# Patient Record
Sex: MALE | Race: WHITE | NOT HISPANIC OR LATINO
[De-identification: names, ages, dates, MRNs, and addresses within clinical notes are randomized per-mention and may not be internally consistent; named-entity substitution may affect disease eponyms.]

---
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
## 2021-04-05 ENCOUNTER — TRANSCRIPTION ENCOUNTER (OUTPATIENT)
Age: 21
End: 2021-04-05

## 2021-12-05 ENCOUNTER — EMERGENCY (EMERGENCY)
Facility: HOSPITAL | Age: 21
LOS: 0 days | Discharge: HOME | End: 2021-12-05
Attending: HOSPITALIST
Payer: COMMERCIAL

## 2021-12-05 VITALS
DIASTOLIC BLOOD PRESSURE: 84 MMHG | RESPIRATION RATE: 18 BRPM | OXYGEN SATURATION: 98 % | SYSTOLIC BLOOD PRESSURE: 161 MMHG | TEMPERATURE: 99 F | HEART RATE: 76 BPM

## 2021-12-05 VITALS — HEIGHT: 69 IN | WEIGHT: 190.04 LBS
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
## 2021-12-05 DIAGNOSIS — M25.532 PAIN IN LEFT WRIST: ICD-10-CM
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
## 2021-12-05 DIAGNOSIS — S52.502A UNSPECIFIED FRACTURE OF THE LOWER END OF LEFT RADIUS, INITIAL ENCOUNTER FOR CLOSED FRACTURE: ICD-10-CM

## 2021-12-05 PROCEDURE — 73110 X-RAY EXAM OF WRIST: CPT | Mod: LT

## 2021-12-05 PROCEDURE — 73120 X-RAY EXAM OF HAND: CPT | Mod: LT

## 2021-12-05 PROCEDURE — 99284 EMERGENCY DEPT VISIT MOD MDM: CPT | Mod: 25

## 2021-12-05 PROCEDURE — 73090 X-RAY EXAM OF FOREARM: CPT | Mod: 26,LT

## 2021-12-05 PROCEDURE — 73090 X-RAY EXAM OF FOREARM: CPT | Mod: LT

## 2021-12-05 PROCEDURE — 73110 X-RAY EXAM OF WRIST: CPT | Mod: 26,LT

## 2021-12-05 PROCEDURE — 99284 EMERGENCY DEPT VISIT MOD MDM: CPT

## 2021-12-05 PROCEDURE — 73090 X-RAY EXAM OF FOREARM: CPT | Mod: 26,LT,77

## 2021-12-05 PROCEDURE — 73120 X-RAY EXAM OF HAND: CPT | Mod: 26,LT

## 2021-12-05 RX ORDER — OXYCODONE AND ACETAMINOPHEN 5; 325 MG/1; MG/1
1 TABLET ORAL ONCE
Refills: 0 | Status: DISCONTINUED | OUTPATIENT
Start: 2021-12-05 | End: 2021-12-05

## 2021-12-05 RX ORDER — IBUPROFEN 200 MG
600 TABLET ORAL ONCE
Refills: 0 | Status: DISCONTINUED | OUTPATIENT
Start: 2021-12-05 | End: 2021-12-05

## 2021-12-05 RX ADMIN — OXYCODONE AND ACETAMINOPHEN 1 TABLET(S): 5; 325 TABLET ORAL at 22:13

## 2021-12-05 NOTE — ED STATDOCS - PATIENT PORTAL LINK FT
You can access the FollowMyHealth Patient Portal offered by [facility] by registering at the following website: [email or web address]. By joining [employer]’s FollowMyHealth portal, you will also be able to view your health information using other applications (apps) compatible with our system.

## 2021-12-05 NOTE — ED STATDOCS - CLINICAL SUMMARY MEDICAL DECISION MAKING FREE TEXT BOX
22 y/o M with L wrist pain and deformity after fall. Pt is R handed. will obtain X ray, pain control and splint.

## 2021-12-05 NOTE — ED STATDOCS - PROGRESS NOTE DETAILS
Pt with deformity to left distal radius, posteriorly.  Sens intact, cap refill < 2 sec, 2+ radial pulses, Stregnth 5/5.  Xray with displaced distal radius fx. Dr. Ag reviewed films.  Resident saw pt in the ED.  Reduced distal Radiaus fx and applied splint.  Reviewed post-reduction films and dc home.  Will see in office tomorrow.  Yvonne Cobos PA-C

## 2021-12-05 NOTE — ED STATDOCS - NS_ ATTENDINGSCRIBEDETAILS _ED_A_ED_FT
Sheila Mcdaniel MD: The history, relevant review of systems, past medical and surgical history, medical decision making, and physical examination was documented by the scribe in my presence and I attest to the accuracy of the documentation.

## 2021-12-05 NOTE — ED STATDOCS - OBJECTIVE STATEMENT
20 y/o M with a PMHx of gynecomastia presents to the ED c/o L wrist pain in the setting of falling  while snowboarding today in [place]. States that the injury occurred 5 hours ago; took Tylenol 2 PM today. States that post injury he came back home  and now presents to the ED for further evaluation and requesting an X ray.

## 2021-12-05 NOTE — ED STATDOCS - CARE PROVIDER_API CALL
Nadiya Ag)  Orthopaedic Surgery; Surgery of the Hand  [address] [place] [address]  Phone: [phone]  Fax: [phone]  Follow Up Time:

## 2021-12-05 NOTE — ED ADULT TRIAGE NOTE - CHIEF COMPLAINT QUOTE
Pt comes to the ED complaining of left wrist injury while snowboarding in [place] earlier today, Splint applied and patient drove to [place] to be evaluated and treated.

## 2021-12-05 NOTE — CONSULT NOTE ADULT - SUBJECTIVE AND OBJECTIVE BOX
21y R Hand Dominant. Male patient presents to [place] ED c/o severe L wrist pain s/p mechanical fall while snowboarding earlier today. Patient denies head hit or LOC. Localizing pain to distal radius. Denies radiation of pain. Pt denies numbness, tingling or burning. Patient denies any other injuries.    PMH:  Gynecomastia      PSH:  No significant past surgical history    Meds: See med rec    T(C): 37.1 (12-05-21 @ 21:34)  HR: 76 (12-05-21 @ 21:34)  BP: 161/84 (12-05-21 @ 21:34)  RR: 18 (12-05-21 @ 21:34)  SpO2: 98% (12-05-21 @ 21:34)  Wt(kg): --    PE :  Gen: NAD, A/Ox3, Following commands    L UE:  Skin intact,  visible deformity of wrist, + soft tissue swelling, no ecchymosis  Decreased ROM of Wrist 2/2 pain  Normal Elbow/Shoulder ROM w/o pain  + TTP over distal radius  No Snuff box TTP  + Rad Pulse   SILT C5-T1  +AIN/PIN/Ulnar/Radial/Musc/Median  compartments soft and compressible    Secondary Survey:   No TTP over bony prominences, SILT, palpable pulses, full/painless A/PROM, compartments soft. No TTP over spinous processes or paraspinal muscles at C/T/L spine. No palpable step off. No other injuries or complaints. Able to SLR BL. Negative logroll/heelstrike BL. Ambulating w/o assistance/pain.    Imaging:  XRay L Wrist  3 views of L Wrist demonstrates L distal radius fracture, intra-articular w/ posterior displacement of carpus/hand in relation to forearm. No other fx/dislocations noted.     Procedure Note:  After verbal consent obtained, ~ 6cc of 1% Lidocaine injected into area around DR/Ulna as hematoma block. UE hung by fingers and reduction maneuver performed. A well padded sugartong splint was applied to Forearm/Wrist and mold held. SD XR obtained which show improved alignment of Fracture. Post reduction NV exam unchanged. Pt tolerated procedure well.    A/P:   21yMale s/p Mech Fall w/ L Distal Radius Fracture s/p closed reduction and splinting.    -Pt advised to remove all jewelry from affected limb.  -Pain control  -Strict Ice/Elevation to help with swelling  -NWB L UE with splint and sling  -Keep splint clean/dry/intact;  -Encourage active finger motion to help with swelling  -Pt aware of possible need for surgical intervention of distal radius fracture. Will FU as outpatient    -Pt made aware of signs and symptoms of compartment syndrome and acute carpal tunnel syndrome. Aware of need to return to ED if symptoms develop.  -Recommend follow up outpatient w/ Dr. Ag tomorrow. The office will call to schedule appointment.  -All Patient's and Family Member's questions answered. Patient/family understand and agree w/ plan.  -Imaging and clinical presentation discussed w/ Dr. Ag who is aware and agrees w/ above plan.       21y R Hand Dominant. Male patient presents to [place] ED c/o severe L wrist pain s/p mechanical fall while snowboarding earlier today. Patient denies head hit or LOC. Localizing pain to distal radius. Denies radiation of pain. Pt denies numbness, tingling or burning. Patient denies any other injuries.    PMH:  Gynecomastia      PSH:  No significant past surgical history    Meds: See med rec    T(C): 37.1 (12-05-21 @ 21:34)  HR: 76 (12-05-21 @ 21:34)  BP: 161/84 (12-05-21 @ 21:34)  RR: 18 (12-05-21 @ 21:34)  SpO2: 98% (12-05-21 @ 21:34)  Wt(kg): --    PE :  Gen: NAD, A/Ox3, Following commands    L UE:  Skin intact,  visible deformity of wrist, + soft tissue swelling, no ecchymosis  Decreased ROM of Wrist 2/2 pain  Normal Elbow/Shoulder ROM w/o pain  + TTP over distal radius  No Snuff box TTP  + Rad Pulse   SILT C5-T1  +AIN/PIN/Ulnar/Radial/Musc/Median  compartments soft and compressible    Secondary Survey:   No TTP over bony prominences, SILT, palpable pulses, full/painless A/PROM, compartments soft. No TTP over spinous processes or paraspinal muscles at C/T/L spine. No palpable step off. No other injuries or complaints. Able to SLR BL. Negative logroll/heelstrike BL. Ambulating w/o assistance/pain.    Imaging:  XRay L Wrist  3 views of L Wrist demonstrates L distal radius fracture, intra-articular w/ posterior displacement of carpus/hand in relation to forearm. No other fx/dislocations noted.     Procedure Note:  After verbal consent obtained, ~ 6cc of 1% Lidocaine injected into area around DR/Ulna as hematoma block. UE hung by fingers and reduction maneuver performed. A well padded sugartong splint was applied to Forearm/Wrist and mold held. SD XR obtained which show improved alignment of Fracture. Post reduction NV exam unchanged. Pt tolerated procedure well.    A/P:   21yMale s/p Mech Fall w/ L Distal Radius Fracture s/p closed reduction and splinting.    -Pt advised to remove all jewelry from affected limb.  -Pain control  -Strict Ice/Elevation to help with swelling  -NWB L UE with splint and sling  -Keep splint clean/dry/intact;  -Encourage active finger motion to help with swelling  -Pt aware of possible need for surgical intervention of distal radius fracture. Will FU as outpatient    -Pt made aware of signs and symptoms of compartment syndrome and acute carpal tunnel syndrome. Aware of need to return to ED if symptoms develop.  -All Patient's and Family Member's questions answered. Patient/family understand and agree w/ plan.

## 2021-12-05 NOTE — CONSULT NOTE ADULT - ATTENDING COMMENTS
Patient seen and evaluated. Reduction performed after hematoma block. Adequate reduction obtained. Risks of possible displacement and need for surgical ORIF has been reviewed. Will followup in office in 1 week for evaluation and repeat xrays.

## 2021-12-06 ENCOUNTER — EMERGENCY (EMERGENCY)
Facility: HOSPITAL | Age: 21
LOS: 0 days | Discharge: HOME | End: 2021-12-06
Attending: EMERGENCY MEDICINE
Payer: COMMERCIAL

## 2021-12-06 VITALS
TEMPERATURE: 98 F | HEART RATE: 80 BPM | SYSTOLIC BLOOD PRESSURE: 138 MMHG | RESPIRATION RATE: 18 BRPM | OXYGEN SATURATION: 100 % | DIASTOLIC BLOOD PRESSURE: 62 MMHG

## 2021-12-06 VITALS — WEIGHT: 190.04 LBS | HEIGHT: 69 IN

## 2021-12-06 DIAGNOSIS — S52.502A UNSPECIFIED FRACTURE OF THE LOWER END OF LEFT RADIUS, INITIAL ENCOUNTER FOR CLOSED FRACTURE: ICD-10-CM
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
## 2021-12-06 PROCEDURE — 99284 EMERGENCY DEPT VISIT MOD MDM: CPT

## 2021-12-06 PROCEDURE — 99282 EMERGENCY DEPT VISIT SF MDM: CPT

## 2021-12-06 NOTE — ED STATDOCS - OBJECTIVE STATEMENT
22 y/o male with PMHx of Gynecomastia presents to the ED, sent in by Dr. Cotto. pt had a snow-boarding accident with a  left distal radius fracture. Pt was seen yesterday, returns today with pain. No other injuries or complaints.

## 2021-12-06 NOTE — ED STATDOCS - PATIENT PORTAL LINK FT
You can access the FollowMyHealth Patient Portal offered by [facility] by registering at the following website: [email or web address]. By joining [employer]’s FollowMyHealth portal, you will also be able to view your health information using other applications (apps) compatible with our system.

## 2021-12-06 NOTE — ED STATDOCS - CARE PROVIDER_API CALL
Nadiya Ag)  Orthopaedic Surgery; Surgery of the Hand  [address] [place] [address]  Phone: [phone]  Fax: [phone]  Follow Up Time:

## 2021-12-06 NOTE — ED ADULT TRIAGE NOTE - BMI (KG/M2)
RN NOTES

 PATIENT STABLE LYING IN THE BED. PER PATIENT REFUSED DINNER BECAUSE OF PAIN.  ENDORSED 
ONCOMING NURSE FOR PLAN OF CARE. 28.1

## 2021-12-06 NOTE — ED STATDOCS - PROGRESS NOTE DETAILS
20 y/o Male returns to the ED sp fall while snowboarding last night with injury to left wrist.  R hand dominant male.  Dr. Ag present in ED to eval pt.  Pt with c/o continued pain.  Was encouraged to elevate left hand, use sling.  Add Ibuprofen 600 mg every hours with Percocet for breakthrough pain.  Will f.u in office next Tuesday.   Yvonne Cobos PA-C

## 2021-12-16 ENCOUNTER — APPOINTMENT (OUTPATIENT)
Dept: ORTHOPEDIC SURGERY | Facility: CLINIC | Age: 21
End: 2021-12-16

## 2022-01-05 ENCOUNTER — APPOINTMENT (OUTPATIENT)
Dept: DERMATOLOGY | Facility: CLINIC | Age: 22
End: 2022-01-05
Payer: COMMERCIAL

## 2022-01-05 PROCEDURE — 99214 OFFICE O/P EST MOD 30 MIN: CPT

## 2022-01-05 RX ORDER — GLYCOPYRRONIUM 2.4 G/100G
2.4 CLOTH TOPICAL
Qty: 1 | Refills: 5 | Status: DISCONTINUED | COMMUNITY
Start: 2019-06-05 | End: 2022-01-05

## 2022-01-05 RX ORDER — TAMOXIFEN CITRATE 20 MG/1
TABLET, FILM COATED ORAL
Refills: 0 | Status: DISCONTINUED | COMMUNITY
End: 2022-01-05

## 2022-01-05 NOTE — ASSESSMENT
[FreeTextEntry1] : Benign nevi of back;  mild atypia;  recommend annual f/u\par \par ? male pattern alopecia;  likely physiologic;  \par \par Therapeutic options and their risks and benefits; along with multiple diagnostic possibilities were discussed at length; risks and benefits of further study were discussed;\par \par Photos taken;  no evidence of active AGA;  observe for now;  photos taken;  recheck in 6 months;  discussed at length

## 2022-01-05 NOTE — PHYSICAL EXAM
[FreeTextEntry3] : + lentigines and solar damage are present in sun exposed areas; \par \par Multiple benign nevi were noted. back;  largest;  1.0 cm red/brown junctional nevus mid upper back\par \par Scalp;  early temporal thinning;  b/l  \par no thinning at top or crown; \par

## 2022-01-05 NOTE — HISTORY OF PRESENT ILLNESS
[de-identified] : Pt. for check of hair;  concerned with hair loss; \par also:  check of moles on back\par

## 2022-05-04 ENCOUNTER — NON-APPOINTMENT (OUTPATIENT)
Age: 22
End: 2022-05-04

## 2022-05-04 DIAGNOSIS — E29.1 TESTICULAR HYPOFUNCTION: ICD-10-CM

## 2022-05-06 ENCOUNTER — APPOINTMENT (OUTPATIENT)
Dept: INTERNAL MEDICINE | Facility: CLINIC | Age: 22
End: 2022-05-06
Payer: COMMERCIAL

## 2022-05-06 VITALS
HEART RATE: 60 BPM | BODY MASS INDEX: 28.63 KG/M2 | WEIGHT: 200 LBS | SYSTOLIC BLOOD PRESSURE: 136 MMHG | HEIGHT: 70 IN | DIASTOLIC BLOOD PRESSURE: 82 MMHG

## 2022-05-06 DIAGNOSIS — Z12.71 ENCOUNTER FOR SCREENING FOR MALIGNANT NEOPLASM OF TESTIS: ICD-10-CM

## 2022-05-06 DIAGNOSIS — Z13.220 ENCOUNTER FOR SCREENING FOR LIPOID DISORDERS: ICD-10-CM

## 2022-05-06 DIAGNOSIS — Z00.00 ENCOUNTER FOR GENERAL ADULT MEDICAL EXAMINATION W/OUT ABNORMAL FINDINGS: ICD-10-CM

## 2022-05-06 DIAGNOSIS — F17.210 NICOTINE DEPENDENCE, CIGARETTES, UNCOMPLICATED: ICD-10-CM

## 2022-05-06 DIAGNOSIS — N62 HYPERTROPHY OF BREAST: ICD-10-CM

## 2022-05-06 DIAGNOSIS — Z78.9 OTHER SPECIFIED HEALTH STATUS: ICD-10-CM

## 2022-05-06 PROCEDURE — 99385 PREV VISIT NEW AGE 18-39: CPT

## 2022-05-06 NOTE — HISTORY OF PRESENT ILLNESS
[de-identified] : Old male presents to establish care and for his annual wellness visit.\par He has a past history of gynecomastia, questionable hypogonadism that was treated when he was younger.  He is not presently on any medications.  He did recently have surgery to address the gynecomastia.  He has not had follow-up with endocrinologist recently.\par Denies any recent illness has been generally well otherwise.  He does complain of some hair loss issues.\par \par He works as an ICU RN.

## 2022-05-06 NOTE — REVIEW OF SYSTEMS
[Fever] : no fever [Shortness Of Breath] : no shortness of breath [Abdominal Pain] : no abdominal pain [Muscle Weakness] : no muscle weakness [Hair Changes] : hair changes [Headache] : no headache [Dizziness] : no dizziness

## 2022-05-06 NOTE — PHYSICAL EXAM
[No Acute Distress] : no acute distress [Clear to Auscultation] : lungs were clear to auscultation bilaterally [Normal] : normal rate, regular rhythm, normal S1 and S2 and no murmur heard [No Edema] : there was no peripheral edema [No Joint Swelling] : no joint swelling [No Focal Deficits] : no focal deficits [Alert and Oriented x3] : oriented to person, place, and time

## 2022-05-06 NOTE — ASSESSMENT
[FreeTextEntry1] : His exam is unremarkable.\par Given prescription for fasting labs.\par \par History of gynecomastia/hypogonadism–he has not had lab work done through his endocrinologist in some time.  In addition to the fasting lab work he is also requesting testosterone level.  He was encouraged to make a follow-up appointment with an endocrinologist.\par \par Family history of testicular cancer.  His brother did have testicular cancer in his endocrinologist previously did follow his alpha-fetoprotein levels and is given obtain 1 again.\par \par Alopecia–he feels his hair is thinning slightly.  We did discuss options including medications like finasteride.  He prefers to avoid any medication at present time especially given his history of gynecomastia.

## 2022-05-06 NOTE — HEALTH RISK ASSESSMENT
[Never] : Never [Yes] : Yes [2 - 4 times a month (2 pts)] : 2-4 times a month (2 points) [1 or 2 (0 pts)] : 1 or 2 (0 points) [Never (0 pts)] : Never (0 points) [No] : In the past 12 months have you used drugs other than those required for medical reasons? No [0] : 2) Feeling down, depressed, or hopeless: Not at all (0) [PHQ-2 Negative - No further assessment needed] : PHQ-2 Negative - No further assessment needed [Audit-CScore] : 2 [[id number]] : 0

## 2022-11-11 ENCOUNTER — APPOINTMENT (OUTPATIENT)
Dept: FAMILY MEDICINE | Facility: CLINIC | Age: 22
End: 2022-11-11

## 2022-11-11 ENCOUNTER — TRANSCRIPTION ENCOUNTER (OUTPATIENT)
Age: 22
End: 2022-11-11

## 2022-11-11 ENCOUNTER — NON-APPOINTMENT (OUTPATIENT)
Age: 22
End: 2022-11-11

## 2022-11-11 VITALS
RESPIRATION RATE: 16 BRPM | OXYGEN SATURATION: 99 % | TEMPERATURE: 98.5 F | SYSTOLIC BLOOD PRESSURE: 156 MMHG | DIASTOLIC BLOOD PRESSURE: 80 MMHG | HEART RATE: 75 BPM

## 2022-11-11 DIAGNOSIS — J06.9 ACUTE UPPER RESPIRATORY INFECTION, UNSPECIFIED: ICD-10-CM

## 2022-11-11 LAB — S PYO AG SPEC QL IA: NEGATIVE

## 2022-11-11 PROCEDURE — 99213 OFFICE O/P EST LOW 20 MIN: CPT | Mod: 25

## 2022-11-11 PROCEDURE — 87880 STREP A ASSAY W/OPTIC: CPT | Mod: QW

## 2022-11-11 NOTE — HISTORY OF PRESENT ILLNESS
[FreeTextEntry8] : 21 y/o male presents with cough, congestion, and wheezing that have been getting progressively worse for a few days.  Vaccinated for COVID.  Using Advil Cold and Sinus.  No fevers or shortness of breath.

## 2022-11-11 NOTE — PHYSICAL EXAM
[No Acute Distress] : no acute distress [Well Nourished] : well nourished [Well Developed] : well developed [Well-Appearing] : well-appearing [Normal Voice/Communication] : normal voice/communication [Normal Sclera/Conjunctiva] : normal sclera/conjunctiva [Normal Oropharynx] : the oropharynx was normal [Supple] : supple [No Respiratory Distress] : no respiratory distress  [Clear to Auscultation] : lungs were clear to auscultation bilaterally [Normal Rate] : normal rate  [Normal S1, S2] : normal S1 and S2 [Normal Bowel Sounds] : normal bowel sounds [Speech Grossly Normal] : speech grossly normal [Normal Affect] : the affect was normal [Normal Mood] : the mood was normal

## 2022-11-11 NOTE — PLAN
[FreeTextEntry1] : 22-year-old male for upper respiratory infection.  PCR for COVID sent.  Rapid strep negative.  Over-the-counter supportive therapy discussed.  If COVID test negative and symptoms persistent beyond 7 to 10 days despite supportive therapy, course antibiotics to be started.  Signs and symptoms warranting further evaluation advised.  All questions answered.  Patient voiced understanding and in agreement above plan.  Return to clinic as recommended.

## 2022-11-11 NOTE — REVIEW OF SYSTEMS
[Sore Throat] : sore throat [Cough] : cough [Fever] : no fever [Fatigue] : no fatigue [Discharge] : no discharge [Earache] : no earache [Chest Pain] : no chest pain [Shortness Of Breath] : no shortness of breath [Abdominal Pain] : no abdominal pain [Dysuria] : no dysuria [Headache] : no headache

## 2022-11-14 LAB — SARS-COV-2 N GENE NPH QL NAA+PROBE: NOT DETECTED

## 2022-12-05 ENCOUNTER — APPOINTMENT (OUTPATIENT)
Dept: DERMATOLOGY | Facility: CLINIC | Age: 22
End: 2022-12-05

## 2022-12-05 PROCEDURE — 99214 OFFICE O/P EST MOD 30 MIN: CPT

## 2022-12-05 RX ORDER — ALUMINUM CHLORIDE 20 %
20 SOLUTION, NON-ORAL TOPICAL
Qty: 1 | Refills: 6 | Status: ACTIVE | COMMUNITY
Start: 2022-12-05 | End: 1900-01-01

## 2022-12-05 RX ORDER — AZITHROMYCIN 250 MG/1
250 TABLET, FILM COATED ORAL
Qty: 1 | Refills: 0 | Status: DISCONTINUED | COMMUNITY
Start: 2022-11-11 | End: 2022-12-05

## 2022-12-05 NOTE — PHYSICAL EXAM
[FreeTextEntry3] : hair:  no appreciable thinning when compared to 1/2022 photos; \par crown intact, mild temporal recession;\par \par + sweating; axillae, hands

## 2022-12-05 NOTE — ASSESSMENT
[FreeTextEntry1] : Hair;\par evidence of physiologic changes, no apparent male pattern alopecia; \par would continue to monitor;\par \par also:  hyperhidrosis;\par States has tried under arms, did not tolerate;\par try to use on hands, feet prn;\par \par discussed btx at length;  sent Rx to specialty pharmacy; \par will administer in office;  procedure discussed

## 2022-12-05 NOTE — HISTORY OF PRESENT ILLNESS
[de-identified] : Pt. for recheck of hair; \par Still c/o losing hair, had photos taken 1/2022; no txs;\par \par also: c/o sweating; works as RN; \par used DrySol under Arms in past, c/o irritation;

## 2023-03-14 RX ORDER — ONABOTULINUMTOXINA 200 [USP'U]/1
200 INJECTION, POWDER, LYOPHILIZED, FOR SOLUTION INTRADERMAL; INTRAMUSCULAR
Qty: 1 | Refills: 3 | Status: ACTIVE | COMMUNITY
Start: 2022-12-05

## 2023-03-21 ENCOUNTER — APPOINTMENT (OUTPATIENT)
Dept: DERMATOLOGY | Facility: CLINIC | Age: 23
End: 2023-03-21
Payer: COMMERCIAL

## 2023-03-21 DIAGNOSIS — L74.510 PRIMARY FOCAL HYPERHIDROSIS, AXILLA: ICD-10-CM

## 2023-03-21 DIAGNOSIS — D22.5 MELANOCYTIC NEVI OF TRUNK: ICD-10-CM

## 2023-03-21 DIAGNOSIS — L65.9 NONSCARRING HAIR LOSS, UNSPECIFIED: ICD-10-CM

## 2023-03-21 PROCEDURE — 64650 CHEMODENERV ECCRINE GLANDS: CPT

## 2023-03-21 PROCEDURE — 99213 OFFICE O/P EST LOW 20 MIN: CPT | Mod: 25

## 2023-03-21 NOTE — ASSESSMENT
[FreeTextEntry1] : hyperhidrosis;  btx today\par The patient tolerated the procedure well.\par \par Therapeutic options and their risks and benefits; along with multiple diagnostic possibilities were discussed at length; risks and benefits of further study were discussed;\par \par discussed next tx in 4-6 months; \par \par alopecia; stable, monitoring

## 2023-05-25 ENCOUNTER — APPOINTMENT (OUTPATIENT)
Dept: FAMILY MEDICINE | Facility: CLINIC | Age: 23
End: 2023-05-25
Payer: COMMERCIAL

## 2023-05-25 VITALS
OXYGEN SATURATION: 98 % | SYSTOLIC BLOOD PRESSURE: 132 MMHG | TEMPERATURE: 98 F | WEIGHT: 194 LBS | HEIGHT: 70 IN | DIASTOLIC BLOOD PRESSURE: 86 MMHG | BODY MASS INDEX: 27.77 KG/M2 | HEART RATE: 83 BPM

## 2023-05-25 DIAGNOSIS — L03.019 CELLULITIS OF UNSPECIFIED FINGER: ICD-10-CM

## 2023-05-25 PROCEDURE — 99213 OFFICE O/P EST LOW 20 MIN: CPT

## 2023-05-25 RX ORDER — AMOXICILLIN AND CLAVULANATE POTASSIUM 875; 125 MG/1; MG/1
875-125 TABLET, COATED ORAL TWICE DAILY
Qty: 14 | Refills: 0 | Status: ACTIVE | COMMUNITY
Start: 2023-05-25 | End: 1900-01-01

## 2023-05-25 NOTE — REVIEW OF SYSTEMS
[Fever] : no fever [Fatigue] : no fatigue [Discharge] : no discharge [Earache] : no earache [Sore Throat] : no sore throat [Chest Pain] : no chest pain [Shortness Of Breath] : no shortness of breath [Cough] : no cough [Abdominal Pain] : no abdominal pain [Dysuria] : no dysuria [Headache] : no headache

## 2023-05-25 NOTE — PHYSICAL EXAM
[No Acute Distress] : no acute distress [Well Nourished] : well nourished [Well Developed] : well developed [Well-Appearing] : well-appearing [Normal Voice/Communication] : normal voice/communication [Normal Sclera/Conjunctiva] : normal sclera/conjunctiva [Normal Oropharynx] : the oropharynx was normal [Supple] : supple [No Respiratory Distress] : no respiratory distress  [Clear to Auscultation] : lungs were clear to auscultation bilaterally [Normal Rate] : normal rate  [Normal S1, S2] : normal S1 and S2 [Normal Bowel Sounds] : normal bowel sounds [Speech Grossly Normal] : speech grossly normal [Normal Affect] : the affect was normal [Normal Mood] : the mood was normal [de-identified] : Mild erythema and swelling surrounding nailbed of thumb

## 2023-05-25 NOTE — HISTORY OF PRESENT ILLNESS
[FreeTextEntry8] : 23-year-old male for tenderness, swelling, redness of thumb for a few days now.  Denies recent manicure.  No drainage from nail bed.  States he might have bit cuticle.

## 2023-05-25 NOTE — PLAN
[FreeTextEntry1] : 23-year-old female for paronychia of thumb.  Augmentin discussed.  Signs and symptoms warranting further eval advised.  All questions answered.  Patient voiced understanding and in agreement to plan.  Return to clinic as recommended.
